# Patient Record
Sex: FEMALE | Race: WHITE | NOT HISPANIC OR LATINO | Employment: OTHER | URBAN - NONMETROPOLITAN AREA
[De-identification: names, ages, dates, MRNs, and addresses within clinical notes are randomized per-mention and may not be internally consistent; named-entity substitution may affect disease eponyms.]

---

## 2024-03-21 ENCOUNTER — TELEPHONE (OUTPATIENT)
Dept: OBSTETRICS AND GYNECOLOGY | Age: 44
End: 2024-03-21
Payer: COMMERCIAL

## 2024-03-21 ENCOUNTER — TELEPHONE (OUTPATIENT)
Dept: FAMILY MEDICINE CLINIC | Facility: CLINIC | Age: 44
End: 2024-03-21
Payer: COMMERCIAL

## 2024-03-22 ENCOUNTER — OFFICE VISIT (OUTPATIENT)
Dept: OBSTETRICS AND GYNECOLOGY | Age: 44
End: 2024-03-22
Payer: COMMERCIAL

## 2024-03-22 VITALS
SYSTOLIC BLOOD PRESSURE: 124 MMHG | DIASTOLIC BLOOD PRESSURE: 88 MMHG | BODY MASS INDEX: 21.22 KG/M2 | WEIGHT: 140 LBS | HEIGHT: 68 IN

## 2024-03-22 DIAGNOSIS — N89.8 VAGINAL DISCHARGE: ICD-10-CM

## 2024-03-22 DIAGNOSIS — N63.41 SUBAREOLAR MASS OF RIGHT BREAST: Primary | ICD-10-CM

## 2024-03-26 ENCOUNTER — TELEPHONE (OUTPATIENT)
Dept: OBSTETRICS AND GYNECOLOGY | Age: 44
End: 2024-03-26
Payer: COMMERCIAL

## 2024-03-26 ENCOUNTER — TELEPHONE (OUTPATIENT)
Dept: OBSTETRICS AND GYNECOLOGY | Age: 44
End: 2024-03-26

## 2024-03-26 DIAGNOSIS — B37.49 CANDIDA INFECTION OF GENITAL REGION: Primary | ICD-10-CM

## 2024-03-26 LAB
A VAGINAE DNA VAG QL NAA+PROBE: ABNORMAL SCORE
BVAB2 DNA VAG QL NAA+PROBE: ABNORMAL SCORE
C ALBICANS DNA VAG QL NAA+PROBE: POSITIVE
C GLABRATA DNA VAG QL NAA+PROBE: NEGATIVE
C TRACH DNA VAG QL NAA+PROBE: NEGATIVE
MEGA1 DNA VAG QL NAA+PROBE: ABNORMAL SCORE
N GONORRHOEA DNA VAG QL NAA+PROBE: NEGATIVE
T VAGINALIS DNA VAG QL NAA+PROBE: NEGATIVE

## 2024-03-26 RX ORDER — FLUCONAZOLE 150 MG/1
150 TABLET ORAL DAILY
Qty: 1 TABLET | Refills: 0 | Status: SHIPPED | OUTPATIENT
Start: 2024-03-26

## 2024-04-01 DIAGNOSIS — M54.2 NECK PAIN: Primary | ICD-10-CM

## 2024-04-16 ENCOUNTER — TELEPHONE (OUTPATIENT)
Dept: OBSTETRICS AND GYNECOLOGY | Age: 44
End: 2024-04-16
Payer: COMMERCIAL

## 2024-04-19 ENCOUNTER — HOSPITAL ENCOUNTER (OUTPATIENT)
Dept: MAMMOGRAPHY | Facility: HOSPITAL | Age: 44
Discharge: HOME | End: 2024-04-19
Payer: COMMERCIAL

## 2024-04-19 ENCOUNTER — HOSPITAL ENCOUNTER (OUTPATIENT)
Dept: ULTRASOUND IMAGING | Facility: HOSPITAL | Age: 44
Discharge: HOME | End: 2024-04-19
Payer: COMMERCIAL

## 2024-04-19 PROCEDURE — 77065 DX MAMMO INCL CAD UNI: CPT

## 2024-04-19 PROCEDURE — 76642 ULTRASOUND BREAST LIMITED: CPT

## 2024-04-19 PROCEDURE — G0279 TOMOSYNTHESIS, MAMMO: HCPCS

## 2024-04-29 ENCOUNTER — OFFICE VISIT (OUTPATIENT)
Dept: FAMILY MEDICINE CLINIC | Facility: CLINIC | Age: 44
End: 2024-04-29
Payer: COMMERCIAL

## 2024-04-29 ENCOUNTER — TELEPHONE (OUTPATIENT)
Dept: FAMILY MEDICINE CLINIC | Facility: CLINIC | Age: 44
End: 2024-04-29

## 2024-04-29 VITALS
WEIGHT: 140 LBS | SYSTOLIC BLOOD PRESSURE: 121 MMHG | DIASTOLIC BLOOD PRESSURE: 81 MMHG | OXYGEN SATURATION: 99 % | HEART RATE: 82 BPM | RESPIRATION RATE: 18 BRPM | BODY MASS INDEX: 21.22 KG/M2 | HEIGHT: 68 IN | TEMPERATURE: 98.6 F

## 2024-04-29 DIAGNOSIS — Z13.1 DIABETES MELLITUS SCREENING: ICD-10-CM

## 2024-04-29 DIAGNOSIS — Z13.0 SCREENING FOR DEFICIENCY ANEMIA: ICD-10-CM

## 2024-04-29 DIAGNOSIS — Z13.220 LIPID SCREENING: ICD-10-CM

## 2024-04-29 DIAGNOSIS — M54.2 NECK PAIN: Primary | ICD-10-CM

## 2024-04-29 DIAGNOSIS — M54.12 CERVICAL RADICULOPATHY: ICD-10-CM

## 2024-04-29 PROCEDURE — 99213 OFFICE O/P EST LOW 20 MIN: CPT | Performed by: FAMILY MEDICINE

## 2024-04-29 PROCEDURE — 96372 THER/PROPH/DIAG INJ SC/IM: CPT | Performed by: FAMILY MEDICINE

## 2024-04-29 RX ORDER — KETOROLAC TROMETHAMINE 30 MG/ML
60 INJECTION, SOLUTION INTRAMUSCULAR; INTRAVENOUS ONCE
Status: DISCONTINUED | OUTPATIENT
Start: 2024-04-29 | End: 2024-04-29

## 2024-04-29 RX ORDER — DEXAMETHASONE SODIUM PHOSPHATE 10 MG/ML
10 INJECTION INTRAMUSCULAR; INTRAVENOUS ONCE
Status: COMPLETED | OUTPATIENT
Start: 2024-04-29 | End: 2024-04-29

## 2024-04-29 RX ORDER — KETOROLAC TROMETHAMINE 30 MG/ML
60 INJECTION, SOLUTION INTRAMUSCULAR; INTRAVENOUS ONCE
Status: DISCONTINUED | OUTPATIENT
Start: 2024-04-29 | End: 2024-04-30

## 2024-04-29 RX ADMIN — DEXAMETHASONE SODIUM PHOSPHATE 10 MG: 10 INJECTION INTRAMUSCULAR; INTRAVENOUS at 16:51

## 2024-04-30 RX ORDER — KETOROLAC TROMETHAMINE 30 MG/ML
60 INJECTION, SOLUTION INTRAMUSCULAR; INTRAVENOUS ONCE
Status: COMPLETED | OUTPATIENT
Start: 2024-04-30 | End: 2024-04-30

## 2024-04-30 RX ADMIN — KETOROLAC TROMETHAMINE 60 MG: 30 INJECTION, SOLUTION INTRAMUSCULAR; INTRAVENOUS at 09:52

## 2024-09-04 DIAGNOSIS — Z30.41 ORAL CONTRACEPTIVE PILL SURVEILLANCE: ICD-10-CM

## 2024-09-07 DIAGNOSIS — G43.109 MIGRAINE WITH AURA AND WITHOUT STATUS MIGRAINOSUS, NOT INTRACTABLE: ICD-10-CM

## 2024-09-09 ENCOUNTER — DOCUMENTATION (OUTPATIENT)
Dept: FAMILY MEDICINE CLINIC | Facility: CLINIC | Age: 44
End: 2024-09-09
Payer: COMMERCIAL

## 2024-09-09 DIAGNOSIS — G43.109 MIGRAINE WITH AURA AND WITHOUT STATUS MIGRAINOSUS, NOT INTRACTABLE: ICD-10-CM

## 2024-09-09 RX ORDER — GALCANEZUMAB 120 MG/ML
120 INJECTION, SOLUTION SUBCUTANEOUS
Qty: 1 ML | Refills: 5 | Status: SHIPPED | OUTPATIENT
Start: 2024-09-09

## 2024-09-09 RX ORDER — DROSPIRENONE AND ETHINYL ESTRADIOL 0.03MG-3MG
1 KIT ORAL DAILY
Qty: 28 TABLET | Refills: 11 | Status: SHIPPED | OUTPATIENT
Start: 2024-09-09

## 2024-09-09 RX ORDER — GALCANEZUMAB 120 MG/ML
INJECTION, SOLUTION SUBCUTANEOUS
Qty: 1 ML | Refills: 0 | OUTPATIENT
Start: 2024-09-09

## 2024-09-11 RX ORDER — GALCANEZUMAB 120 MG/ML
INJECTION, SOLUTION SUBCUTANEOUS
Qty: 1 ML | Refills: 0 | OUTPATIENT
Start: 2024-09-11

## 2025-01-06 ENCOUNTER — TELEMEDICINE (OUTPATIENT)
Dept: FAMILY MEDICINE CLINIC | Facility: CLINIC | Age: 45
End: 2025-01-06
Payer: COMMERCIAL

## 2025-01-06 DIAGNOSIS — H00.012 HORDEOLUM EXTERNUM OF RIGHT LOWER EYELID: Primary | ICD-10-CM

## 2025-01-06 PROCEDURE — 99213 OFFICE O/P EST LOW 20 MIN: CPT | Performed by: FAMILY MEDICINE

## 2025-01-06 RX ORDER — POLYMYXIN B SULFATE AND TRIMETHOPRIM 1; 10000 MG/ML; [USP'U]/ML
1 SOLUTION OPHTHALMIC EVERY 6 HOURS
Qty: 10 ML | Refills: 0 | Status: SHIPPED | OUTPATIENT
Start: 2025-01-06 | End: 2025-01-13

## 2025-01-06 RX ORDER — CEPHALEXIN 500 MG/1
500 CAPSULE ORAL 2 TIMES DAILY
Qty: 10 CAPSULE | Refills: 0 | Status: SHIPPED | OUTPATIENT
Start: 2025-01-06 | End: 2025-01-11

## 2025-01-06 RX ORDER — ERYTHROMYCIN 5 MG/G
OINTMENT OPHTHALMIC NIGHTLY
Qty: 1 G | Refills: 0 | Status: SHIPPED | OUTPATIENT
Start: 2025-01-06

## 2025-01-07 NOTE — PROGRESS NOTES
Subjective   Ananya KIM is a 44 y.o. female who presents via mychart video visit with complaints of a stye on her right bottom eye for the past several days - not improving.    She is at work, I am in office.     History of Present Illness      Results      The following portions of the patient's history were reviewed and updated as appropriate: allergies, current medications, past family history, past medical history, past social history, past surgical history, and problem list.        A review of systems was performed, and pertinent findings are noted in the HPI.    Objective   There were no vitals taken for this visit.   BMI is within normal parameters. No other follow-up for BMI required.     Physical Exam  Constitutional:       General: She is not in acute distress.     Appearance: Normal appearance. She is not toxic-appearing.   HENT:      Head: Normocephalic and atraumatic.   Eyes:      General:         Right eye: Hordeolum present.     Pulmonary:      Effort: Pulmonary effort is normal. No respiratory distress.   Neurological:      Mental Status: She is alert and oriented to person, place, and time.   Psychiatric:         Mood and Affect: Mood normal.         Behavior: Behavior normal.         Thought Content: Thought content normal.         Judgment: Judgment normal.         Assessment & Plan   Problems Addressed this Visit    None  Visit Diagnoses       Hordeolum externum of right lower eyelid    -  Primary    Relevant Medications    cephalexin (Keflex) 500 MG capsule    erythromycin (ROMYCIN) 5 MG/GM ophthalmic ointment    trimethoprim-polymyxin b (Polytrim) 92951-5.1 UNIT/ML-% ophthalmic solution          Diagnoses         Codes Comments    Hordeolum externum of right lower eyelid    -  Primary ICD-10-CM: H00.012  ICD-9-CM: 373.11             Assessment & Plan        Treatment as above   Advised on home care   Return if not improving     10 minutes          Transcribed from ambient dictation for Maria Luisa  Lena Curtis MD by Maria Luisa Curtis MD.  01/07/25   08:41 CST    Patient or patient representative verbalized consent for the use of Ambient Listening during the visit with  Maria Luisa Curtis MD for chart documentation. 1/7/2025  08:42 CST          This document has been electronically signed by Maria Luisa Curtis MD on January 7, 2025 08:42 CST

## 2025-01-24 ENCOUNTER — OFFICE VISIT (OUTPATIENT)
Dept: FAMILY MEDICINE CLINIC | Facility: CLINIC | Age: 45
End: 2025-01-24
Payer: COMMERCIAL

## 2025-01-24 VITALS
BODY MASS INDEX: 22.22 KG/M2 | WEIGHT: 146.6 LBS | TEMPERATURE: 97.8 F | HEIGHT: 68 IN | SYSTOLIC BLOOD PRESSURE: 137 MMHG | OXYGEN SATURATION: 97 % | DIASTOLIC BLOOD PRESSURE: 85 MMHG | HEART RATE: 74 BPM

## 2025-01-24 DIAGNOSIS — G43.019 INTRACTABLE MIGRAINE WITHOUT AURA AND WITHOUT STATUS MIGRAINOSUS: ICD-10-CM

## 2025-01-24 DIAGNOSIS — R14.0 GENERALIZED BLOATING: ICD-10-CM

## 2025-01-24 DIAGNOSIS — Z12.31 BREAST CANCER SCREENING BY MAMMOGRAM: ICD-10-CM

## 2025-01-24 DIAGNOSIS — M85.80 OSTEOPENIA, UNSPECIFIED LOCATION: ICD-10-CM

## 2025-01-24 DIAGNOSIS — Z12.4 CERVICAL CANCER SCREENING: ICD-10-CM

## 2025-01-24 DIAGNOSIS — R05.3 CHRONIC COUGH: ICD-10-CM

## 2025-01-24 DIAGNOSIS — R79.89 ELEVATED LFTS: Primary | ICD-10-CM

## 2025-01-24 DIAGNOSIS — Z12.11 COLON CANCER SCREENING: ICD-10-CM

## 2025-01-24 DIAGNOSIS — R53.82 CHRONIC FATIGUE: ICD-10-CM

## 2025-01-24 LAB
ALBUMIN SERPL-MCNC: 3.6 G/DL (ref 3.5–5.2)
ALBUMIN/GLOB SERPL: 1.2 G/DL
ALP SERPL-CCNC: 66 U/L (ref 39–117)
ALT SERPL-CCNC: 57 U/L (ref 1–33)
AST SERPL-CCNC: 92 U/L (ref 1–32)
BILIRUB SERPL-MCNC: 0.2 MG/DL (ref 0–1.2)
BUN SERPL-MCNC: 10 MG/DL (ref 6–20)
BUN/CREAT SERPL: 11.5 (ref 7–25)
CALCIUM SERPL-MCNC: 8.8 MG/DL (ref 8.6–10.5)
CHLORIDE SERPL-SCNC: 102 MMOL/L (ref 98–107)
CHOLEST SERPL-MCNC: 157 MG/DL (ref 0–200)
CO2 SERPL-SCNC: 25.3 MMOL/L (ref 22–29)
CREAT SERPL-MCNC: 0.87 MG/DL (ref 0.57–1)
EGFRCR SERPLBLD CKD-EPI 2021: 84.4 ML/MIN/1.73
ERYTHROCYTE [DISTWIDTH] IN BLOOD BY AUTOMATED COUNT: 11.9 % (ref 12.3–15.4)
GLOBULIN SER CALC-MCNC: 3 GM/DL
GLUCOSE SERPL-MCNC: 100 MG/DL (ref 65–99)
HCT VFR BLD AUTO: 40.1 % (ref 34–46.6)
HDLC SERPL-MCNC: 78 MG/DL (ref 40–60)
HGB BLD-MCNC: 13.4 G/DL (ref 12–15.9)
LDLC SERPL CALC-MCNC: 58 MG/DL (ref 0–100)
MCH RBC QN AUTO: 32.4 PG (ref 26.6–33)
MCHC RBC AUTO-ENTMCNC: 33.4 G/DL (ref 31.5–35.7)
MCV RBC AUTO: 96.9 FL (ref 79–97)
PLATELET # BLD AUTO: 234 10*3/MM3 (ref 140–450)
POTASSIUM SERPL-SCNC: 4 MMOL/L (ref 3.5–5.2)
PROT SERPL-MCNC: 6.6 G/DL (ref 6–8.5)
RBC # BLD AUTO: 4.14 10*6/MM3 (ref 3.77–5.28)
SODIUM SERPL-SCNC: 138 MMOL/L (ref 136–145)
TRIGL SERPL-MCNC: 125 MG/DL (ref 0–150)
VLDLC SERPL CALC-MCNC: 21 MG/DL (ref 5–40)
WBC # BLD AUTO: 4.51 10*3/MM3 (ref 3.4–10.8)

## 2025-01-24 PROCEDURE — 99214 OFFICE O/P EST MOD 30 MIN: CPT | Performed by: FAMILY MEDICINE

## 2025-01-24 RX ORDER — ALENDRONATE SODIUM 70 MG/1
70 TABLET ORAL
Qty: 12 TABLET | Refills: 1 | Status: SHIPPED | OUTPATIENT
Start: 2025-01-24

## 2025-01-24 NOTE — PROGRESS NOTES
Subjective   Ananya KIM is a 44 y.o. female.     History of Present Illness  The patient is a 44-year-old female who presents for evaluation of abdominal bloating, chronic cough, breast lumps, emotional instability, and osteopenia.    She reports experiencing abdominal bloating and discomfort after meals, a symptom that has been progressively worsening over the past 3 years. She maintains regular bowel movements, typically twice daily, with no recent changes or presence of blood in her stool.    She has been dealing with a persistent cough for over a year, which she attributes to postnasal drainage. Despite a recent illness that confined her to bed for 3 days, the cough has not subsided. She took NyQuil and DayQuil on Sunday, Monday, Tuesday, and Wednesday.    She has several palpable lumps in her breasts, which have been present since her implant surgery. She reports no new breast-related concerns such as discharge or skin changes.    She also reports emotional instability over the past few years, often breaking into tears. Additionally, she has noticed puffiness and dark circles under her eyes, particularly on the right side, which are more pronounced upon waking up. She is considering dietary modifications to address these symptoms.    She has not initiated her prescribed medication for osteopenia.    Supplemental Information  She took NyQuil and DayQuil on Sunday, Monday, Tuesday, and Wednesday. She takes Excedrin as needed.    SOCIAL HISTORY  She does not smoke. She may have one or two glasses of wine on the weekend at a restaurant.    FAMILY HISTORY  Her grandmother had cancer. Her mother went through menopause at age 47 or 48.    MEDICATIONS  Current: Excedrin, Emgality  Past: NyQuil, DayQuil    Results  Laboratory Studies  Total cholesterol was 157, triglycerides were 123, HDL was 78, LDL was 58. CBC was normal. CMP showed normal glucose and kidney numbers, electrolytes were normal. AST and ALT were  "elevated.    Imaging  Ultrasound on the right breast looked benign. Bilateral mammogram in September 2023 was normal.    The following portions of the patient's history were reviewed and updated as appropriate: allergies, current medications, past family history, past medical history, past social history, past surgical history, and problem list.        A review of systems was performed, and pertinent findings are noted in the HPI.    Objective   /85 (BP Location: Left arm, Patient Position: Sitting, Cuff Size: Adult)   Pulse 74   Temp 97.8 °F (36.6 °C) (Temporal)   Ht 172.7 cm (67.99\")   Wt 66.5 kg (146 lb 9.6 oz)   SpO2 97%   BMI 22.30 kg/m²    BMI is within normal parameters. No other follow-up for BMI required.     Physical Exam  Vitals and nursing note reviewed.   Constitutional:       General: She is not in acute distress.     Appearance: Normal appearance. She is well-developed. She is not diaphoretic.   HENT:      Head: Normocephalic and atraumatic.      Right Ear: External ear normal.      Left Ear: External ear normal.      Nose: Nose normal.   Eyes:      General:         Right eye: No discharge.         Left eye: No discharge.      Conjunctiva/sclera: Conjunctivae normal.   Neck:      Thyroid: No thyromegaly.      Trachea: No tracheal deviation.   Cardiovascular:      Rate and Rhythm: Normal rate and regular rhythm.      Heart sounds: Normal heart sounds.   Pulmonary:      Effort: Pulmonary effort is normal. No respiratory distress.      Breath sounds: Normal breath sounds. No stridor. No wheezing.   Chest:      Chest wall: No tenderness.   Abdominal:      General: There is no distension.      Palpations: Abdomen is soft.      Tenderness: There is no abdominal tenderness.   Musculoskeletal:         General: Normal range of motion.      Cervical back: Normal range of motion.   Lymphadenopathy:      Cervical: No cervical adenopathy.   Skin:     General: Skin is warm and dry.   Neurological:      " Mental Status: She is alert and oriented to person, place, and time.      Motor: No abnormal muscle tone.      Coordination: Coordination normal.   Psychiatric:         Behavior: Behavior normal.         Thought Content: Thought content normal.         Judgment: Judgment normal.         Assessment & Plan   Problems Addressed this Visit          Neuro    Intractable migraine without aura and without status migrainosus     Other Visit Diagnoses       Elevated LFTs    -  Primary    Relevant Orders    CT Abdomen Pelvis With Contrast    Comprehensive metabolic panel    Breast cancer screening by mammogram        Relevant Orders    Mammo Screening Digital Tomosynthesis Bilateral With CAD    Cervical cancer screening        Relevant Orders    Ambulatory Referral to Obstetrics / Gynecology    Colon cancer screening        Relevant Orders    Ambulatory Referral to Gastroenterology    Chronic cough        Relevant Orders    XR Chest PA & Lateral (In Office)    Chronic fatigue        Relevant Orders    T4, free    TSH    Generalized bloating        Relevant Orders    Celiac Comprehensive Panel          Diagnoses         Codes Comments    Elevated LFTs    -  Primary ICD-10-CM: R79.89  ICD-9-CM: 790.6     Breast cancer screening by mammogram     ICD-10-CM: Z12.31  ICD-9-CM: V76.12     Cervical cancer screening     ICD-10-CM: Z12.4  ICD-9-CM: V76.2     Colon cancer screening     ICD-10-CM: Z12.11  ICD-9-CM: V76.51     Chronic cough     ICD-10-CM: R05.3  ICD-9-CM: 786.2     Intractable migraine without aura and without status migrainosus     ICD-10-CM: G43.019  ICD-9-CM: 346.11     Chronic fatigue     ICD-10-CM: R53.82  ICD-9-CM: 780.79     Generalized bloating     ICD-10-CM: R14.0  ICD-9-CM: 780.99             Assessment & Plan  1. Abdominal bloating.  She reports that everything she eats upsets her stomach, causing bloating. This issue started about 3 years ago and has gradually worsened. Her liver enzymes (AST and ALT) were  elevated, which could be due to recent illness and medication use (NyQuil and DayQuil). A CT scan of the abdomen and pelvis will be ordered to rule out any underlying conditions. Repeat lab testing, including thyroid levels and tests for gluten or celiac disease, will be conducted in a couple of weeks.    2. Chronic cough.  She has had a persistent cough for over a year, which she attributes to drainage going to the back of her throat. A chest x-ray will be performed today. If the chest x-ray is inconclusive, a chest scan may be added during her abdominal CT scan.    3. Breast lumps.  She has several lumps in her breasts, which have been present since her implants. Her last bilateral mammogram was in September 2023, and a diagnostic mammogram of the right breast was done in April 2024, which looked benign. A screening bilateral mammogram will be ordered to update her breast imaging.    4. Emotional instability.  She reports being very emotional over the past couple of years, often breaking into tears. If her mood continues to be an issue, different medications may be considered to help stabilize her emotions.    5. Osteopenia.  She has not started her prescribed medication for osteopenia. A new prescription for Fosamax will be sent to her pharmacy. Her last bone density test will be reviewed, and it may be updated when she has her mammogram.    6. Health maintenance.  Her blood pressure is 137/85, heart rate is normal, no fever, oxygen level is good, and weight is 140, putting her BMI at 21, which is normal. Her cholesterol levels are excellent: total cholesterol 157, triglycerides 123, HDL 78, LDL 58. Her CBC and CMP are normal, except for elevated liver enzymes. She had a Pap smear in March 2023 and saw Dr. Anthony in April 2023 for a colposcopy and biopsy result discussion. He recommended a repeat Pap smear in a year, which was missed. A referral to Dr. Anthony will be placed to update her Pap smear. A referral to  a gastroenterologist will be made for colon cancer screening, as she is turning 45 next month.    Follow-up  The patient will follow up in 3 months.    PROCEDURE  The patient underwent breast implant surgery in the past.               Transcribed from ambient dictation for Maria Luisa Curtis MD by Maria Luisa Curtis MD.  01/24/25   14:00 CST    Patient or patient representative verbalized consent for the use of Ambient Listening during the visit with  Maria Luisa Curtis MD for chart documentation. 1/24/2025  14:00 CST  Answers submitted by the patient for this visit:  Cough Questionnaire (Submitted on 1/23/2025)  Chief Complaint: Cough  Chronicity: chronic  Onset: more than 1 year ago  Progression since onset: unchanged  Frequency: every few minutes  Cough characteristics: productive of clear sputum  chest pain: No  chills: No  ear congestion: No  ear pain: No  fever: No  headaches: Yes  heartburn: No  hemoptysis: No  myalgias: No  nasal congestion: Yes  postnasal drip: Yes  rash: No  rhinorrhea: Yes  shortness of breath: No  sore throat: No  sweats: No  weight loss: No  wheezing: No  Aggravated by: cold air, exercise, pollens, stress        This document has been electronically signed by Maria Luisa Curtis MD on January 29, 2025 16:16 CST

## 2025-02-07 RX ORDER — HYDROXYZINE PAMOATE 25 MG/1
25 CAPSULE ORAL 3 TIMES DAILY PRN
Qty: 30 CAPSULE | Refills: 2 | Status: SHIPPED | OUTPATIENT
Start: 2025-02-07